# Patient Record
Sex: FEMALE | Race: WHITE | Employment: PART TIME | URBAN - METROPOLITAN AREA
[De-identification: names, ages, dates, MRNs, and addresses within clinical notes are randomized per-mention and may not be internally consistent; named-entity substitution may affect disease eponyms.]

---

## 2021-07-19 ENCOUNTER — HOSPITAL ENCOUNTER (OUTPATIENT)
Dept: NON INVASIVE DIAGNOSTICS | Age: 56
Discharge: HOME | End: 2021-07-19
Payer: COMMERCIAL

## 2021-07-19 PROCEDURE — 93005 ELECTROCARDIOGRAM TRACING: CPT | Performed by: FAMILY MEDICINE

## 2021-07-20 LAB
EKG ATRIAL RATE: 64 BPM
EKG P AXIS: 62 DEGREES
EKG P-R INTERVAL: 126 MS
EKG Q-T INTERVAL: 394 MS
EKG QRS DURATION: 80 MS
EKG QTC CALCULATION (BAZETT): 406 MS
EKG R AXIS: 75 DEGREES
EKG T AXIS: 28 DEGREES
EKG VENTRICULAR RATE: 64 BPM

## 2021-07-20 PROCEDURE — 93010 ELECTROCARDIOGRAM REPORT: CPT | Performed by: INTERNAL MEDICINE

## 2025-01-13 ENCOUNTER — APPOINTMENT (OUTPATIENT)
Dept: PRIMARY CARE | Facility: CLINIC | Age: 60
End: 2025-01-13
Payer: COMMERCIAL

## 2025-01-13 VITALS
BODY MASS INDEX: 32.1 KG/M2 | SYSTOLIC BLOOD PRESSURE: 104 MMHG | OXYGEN SATURATION: 98 % | DIASTOLIC BLOOD PRESSURE: 80 MMHG | WEIGHT: 170 LBS | HEART RATE: 88 BPM | RESPIRATION RATE: 14 BRPM | HEIGHT: 61 IN

## 2025-01-13 DIAGNOSIS — Z12.31 ENCOUNTER FOR SCREENING MAMMOGRAM FOR BREAST CANCER: ICD-10-CM

## 2025-01-13 DIAGNOSIS — Z00.00 PREVENTATIVE HEALTH CARE: ICD-10-CM

## 2025-01-13 DIAGNOSIS — E55.9 MILD VITAMIN D DEFICIENCY: ICD-10-CM

## 2025-01-13 DIAGNOSIS — R63.5 WEIGHT GAIN: Primary | ICD-10-CM

## 2025-01-13 PROCEDURE — 99203 OFFICE O/P NEW LOW 30 MIN: CPT | Performed by: INTERNAL MEDICINE

## 2025-01-13 PROCEDURE — 3008F BODY MASS INDEX DOCD: CPT | Performed by: INTERNAL MEDICINE

## 2025-01-13 ASSESSMENT — ENCOUNTER SYMPTOMS
DIARRHEA: 0
NAUSEA: 0
FEVER: 0
CHILLS: 0
JOINT SWELLING: 0
DIAPHORESIS: 0
MYALGIAS: 0
COUGH: 0
SHORTNESS OF BREATH: 0
CONSTIPATION: 0
VOMITING: 0

## 2025-01-13 ASSESSMENT — PATIENT HEALTH QUESTIONNAIRE - PHQ9
SUM OF ALL RESPONSES TO PHQ9 QUESTIONS 1 AND 2: 0
2. FEELING DOWN, DEPRESSED OR HOPELESS: NOT AT ALL
1. LITTLE INTEREST OR PLEASURE IN DOING THINGS: NOT AT ALL

## 2025-01-13 NOTE — PATIENT INSTRUCTIONS
FASTING LABS ARE ORDERED FOR YOU    2.  SIGN TO GET LABS, XRAYS, MAMMOGRAM, IMMUNIZATION RECORDS, COLOGUARD TEST RESULT FROM [facility]IOR CLIN[facility] IN [place].    3.  ONCE WE GET SOME LAB TESTS BACK, THEN I'LL LET YOU KNOW AND LIKELY REFER YOU TO THE PHARMACY CONSULTANT TO WORK ON ACQUIRING FOR YOU THE BEST WEIGHT LOSS MEDICATION FOR YOU THAT WILL BE MOST COST CONSERVING.  IF THEY DECIDE UPON ADIPEX OR SIMILAR AS THE BEST OPTION, THEN I'LL WORK WITH YOU ON THAT HERE    4.  CONSIDER GYN EXAMINATION AS PART OF ROUTINE CARE FOR YOUR AGE GROUP, CALL ME IF YOU'D LIKE REFERRAL    5. SCREENING MAMMOGRAM ORDERED FOR YOU    6.  FOLLOW UP AFTER TESTING.

## 2025-01-13 NOTE — PROGRESS NOTES
"Subjective   Preeti Burch is a 60 y.o. female who presents for NP ESTABLISH   DISCUSS WT   .    HPI   TAKE NO MEDICINE    HEALTH HAS BEEN GOOD/STABLE.    [employer].    NEVER HAD GLUCOSE OR DIABETIC TROUBLES.    TRIED ADIPEX 2-3 YEARS AGO, LAST TIME BLOOD DRAWN.    ADIPEX WORKED FOR HER, BUT CAN ONLY TAKE IT FOR A FEW MONTHS.    DID COLOGUARD DONE 1.5 YEARS AGO?    GAIN WEIGHT BUT CAN'T LOSE IT    Review of Systems   Constitutional:  Negative for chills, diaphoresis and fever.   Respiratory:  Negative for cough and shortness of breath.    Cardiovascular:  Negative for chest pain and leg swelling.   Gastrointestinal:  Negative for constipation, diarrhea, nausea and vomiting.   Musculoskeletal:  Negative for joint swelling and myalgias.       Objective   /80   Pulse 88   Resp 14   Ht 1.549 m (5' 1\")   Wt 77.1 kg (170 lb)   SpO2 98%   BMI 32.12 kg/m²     Physical Exam  Vitals reviewed.   Constitutional:       General: She is not in acute distress.     Appearance: She is not ill-appearing.      Comments: OVERWEIGHT   Cardiovascular:      Rate and Rhythm: Normal rate and regular rhythm.      Pulses: Normal pulses.      Heart sounds:      No gallop.   Pulmonary:      Breath sounds: Normal breath sounds. No wheezing, rhonchi or rales.   Abdominal:      General: Abdomen is flat. Bowel sounds are normal.      Palpations: Abdomen is soft.      Tenderness: There is no guarding or rebound.   Musculoskeletal:      Right lower leg: No edema.      Left lower leg: No edema.         Assessment/Plan   Problem List Items Addressed This Visit    None  Visit Diagnoses       Weight gain    -  Primary    Relevant Orders    Vitamin B12    Lipid Panel    TSH with reflex to Free T4 if abnormal    Comprehensive Metabolic Panel    CBC    Encounter for screening mammogram for breast cancer        Relevant Orders    BI mammo bilateral screening tomosynthesis    Preventative health care        Relevant Orders    Vitamin B12 "    Lipid Panel    TSH with reflex to Free T4 if abnormal    Comprehensive Metabolic Panel    CBC    Mild vitamin D deficiency        Relevant Orders    Vitamin D 25-Hydroxy,Total (for eval of Vitamin D levels)          Patient Instructions    FASTING LABS ARE ORDERED FOR YOU    2.  SIGN TO GET LABS, XRAYS, MAMMOGRAM, IMMUNIZATION RECORDS, COLOGUARD TEST RESULT FROM [facility]IOR CLIN[facility] IN [place].    3.  ONCE WE GET SOME LAB TESTS BACK, THEN I'LL LET YOU KNOW AND LIKELY REFER YOU TO THE PHARMACY CONSULTANT TO WORK ON ACQUIRING FOR YOU THE BEST WEIGHT LOSS MEDICATION FOR YOU THAT WILL BE MOST COST CONSERVING.  IF THEY DECIDE UPON ADIPEX OR SIMILAR AS THE BEST OPTION, THEN I'LL WORK WITH YOU ON THAT HERE    4.  CONSIDER GYN EXAMINATION AS PART OF ROUTINE CARE FOR YOUR AGE GROUP, CALL ME IF YOU'D LIKE REFERRAL    5. SCREENING MAMMOGRAM ORDERED FOR YOU    6.  FOLLOW UP AFTER TESTING.

## 2025-02-05 LAB
ALBUMIN SERPL-MCNC: 4.8 G/DL (ref 3.6–5.1)
ALP SERPL-CCNC: 83 U/L (ref 37–153)
ALT SERPL-CCNC: 36 U/L (ref 6–29)
ANION GAP SERPL CALCULATED.4IONS-SCNC: 12 MMOL/L (CALC) (ref 7–17)
AST SERPL-CCNC: 31 U/L (ref 10–35)
BILIRUB SERPL-MCNC: 0.7 MG/DL (ref 0.2–1.2)
BUN SERPL-MCNC: 11 MG/DL (ref 7–25)
CALCIUM SERPL-MCNC: 9.8 MG/DL (ref 8.6–10.4)
CHLORIDE SERPL-SCNC: 104 MMOL/L (ref 98–110)
CHOLEST SERPL-MCNC: 182 MG/DL
CHOLEST/HDLC SERPL: 3.4 (CALC)
CO2 SERPL-SCNC: 25 MMOL/L (ref 20–32)
CREAT SERPL-MCNC: 0.74 MG/DL (ref 0.5–1.05)
EGFRCR SERPLBLD CKD-EPI 2021: 91 ML/MIN/1.73M2
GLUCOSE SERPL-MCNC: 82 MG/DL (ref 65–99)
HDLC SERPL-MCNC: 53 MG/DL
LDLC SERPL CALC-MCNC: 110 MG/DL (CALC)
NONHDLC SERPL-MCNC: 129 MG/DL (CALC)
POTASSIUM SERPL-SCNC: 4.3 MMOL/L (ref 3.5–5.3)
PROT SERPL-MCNC: 8.5 G/DL (ref 6.1–8.1)
SODIUM SERPL-SCNC: 141 MMOL/L (ref 135–146)
TRIGL SERPL-MCNC: 94 MG/DL

## 2025-02-11 DIAGNOSIS — E66.09 CLASS 1 OBESITY DUE TO EXCESS CALORIES WITHOUT SERIOUS COMORBIDITY WITH BODY MASS INDEX (BMI) OF 32.0 TO 32.9 IN ADULT: ICD-10-CM

## 2025-02-11 DIAGNOSIS — E88.09 HYPERPROTEINEMIA: Primary | ICD-10-CM

## 2025-02-11 DIAGNOSIS — E66.811 CLASS 1 OBESITY DUE TO EXCESS CALORIES WITHOUT SERIOUS COMORBIDITY WITH BODY MASS INDEX (BMI) OF 32.0 TO 32.9 IN ADULT: ICD-10-CM

## 2025-02-11 LAB
25(OH)D3+25(OH)D2 SERPL-MCNC: 42 NG/ML (ref 30–100)
ERYTHROCYTE [DISTWIDTH] IN BLOOD BY AUTOMATED COUNT: 13.4 % (ref 11–15)
HCT VFR BLD AUTO: 46.6 % (ref 35–45)
HGB BLD-MCNC: 14.7 G/DL (ref 11.7–15.5)
MCH RBC QN AUTO: 29.6 PG (ref 27–33)
MCHC RBC AUTO-ENTMCNC: 31.5 G/DL (ref 32–36)
MCV RBC AUTO: 94 FL (ref 80–100)
PLATELET # BLD AUTO: 246 THOUSAND/UL (ref 140–400)
PMV BLD REES-ECKER: 12.7 FL (ref 7.5–12.5)
RBC # BLD AUTO: 4.96 MILLION/UL (ref 3.8–5.1)
TSH SERPL-ACNC: 1.47 MIU/L (ref 0.4–4.5)
VIT B12 SERPL-MCNC: 289 PG/ML (ref 200–1100)
WBC # BLD AUTO: 7.8 THOUSAND/UL (ref 3.8–10.8)

## 2025-02-12 ENCOUNTER — APPOINTMENT (OUTPATIENT)
Dept: RADIOLOGY | Facility: HOSPITAL | Age: 60
End: 2025-02-12
Payer: COMMERCIAL

## 2025-03-19 ENCOUNTER — APPOINTMENT (OUTPATIENT)
Dept: PRIMARY CARE | Facility: CLINIC | Age: 60
End: 2025-03-19
Payer: COMMERCIAL

## 2025-03-19 VITALS
OXYGEN SATURATION: 97 % | DIASTOLIC BLOOD PRESSURE: 80 MMHG | WEIGHT: 171 LBS | SYSTOLIC BLOOD PRESSURE: 122 MMHG | HEIGHT: 61 IN | RESPIRATION RATE: 14 BRPM | BODY MASS INDEX: 32.28 KG/M2

## 2025-03-19 DIAGNOSIS — E66.09 CLASS 1 OBESITY DUE TO EXCESS CALORIES WITHOUT SERIOUS COMORBIDITY WITH BODY MASS INDEX (BMI) OF 32.0 TO 32.9 IN ADULT: ICD-10-CM

## 2025-03-19 DIAGNOSIS — Z12.31 ENCOUNTER FOR SCREENING MAMMOGRAM FOR BREAST CANCER: Primary | ICD-10-CM

## 2025-03-19 DIAGNOSIS — Z00.00 PREVENTATIVE HEALTH CARE: ICD-10-CM

## 2025-03-19 DIAGNOSIS — E66.811 CLASS 1 OBESITY DUE TO EXCESS CALORIES WITHOUT SERIOUS COMORBIDITY WITH BODY MASS INDEX (BMI) OF 32.0 TO 32.9 IN ADULT: ICD-10-CM

## 2025-03-19 DIAGNOSIS — Z12.11 ENCOUNTER FOR SCREENING FOR MALIGNANT NEOPLASM OF COLON: ICD-10-CM

## 2025-03-19 PROCEDURE — 90471 IMMUNIZATION ADMIN: CPT | Performed by: INTERNAL MEDICINE

## 2025-03-19 PROCEDURE — 3008F BODY MASS INDEX DOCD: CPT | Performed by: INTERNAL MEDICINE

## 2025-03-19 PROCEDURE — 99213 OFFICE O/P EST LOW 20 MIN: CPT | Performed by: INTERNAL MEDICINE

## 2025-03-19 PROCEDURE — 90677 PCV20 VACCINE IM: CPT | Performed by: INTERNAL MEDICINE

## 2025-03-19 ASSESSMENT — PATIENT HEALTH QUESTIONNAIRE - PHQ9
1. LITTLE INTEREST OR PLEASURE IN DOING THINGS: NOT AT ALL
2. FEELING DOWN, DEPRESSED OR HOPELESS: NOT AT ALL
SUM OF ALL RESPONSES TO PHQ9 QUESTIONS 1 AND 2: 0

## 2025-03-19 NOTE — PATIENT INSTRUCTIONS
PREVNAR-20 PNEUMONIA IMMUNIZATION IS ADMINISTERED TO YOU TODAY, IT LASTS 7-10 YEARS    2.  MAMMOGRAM IS ORDERED.    3.  YOUR LABS REVIEWED LOOK GREAT, NEED ONE MORE BLOOD TEST TO VERIFY THE CORRECT PROTEIN IN THE BLOOD STREAM    4.  PHARMACY REFERRAL REGARDING THE BEST WEIGHT LOSS MEDICATION FOR YOU    5.  FOLLOW UP 6 MONTHS OR AS NEEDED FOR WEIGHT LOSS MEDS.

## 2025-03-19 NOTE — PROGRESS NOTES
"Subjective   Preeti Burch is a 60 y.o. female who presents for FOLLOW UP   .    HPI   GOT TETANUS SHOT IN THE PAST FEW YEARS    INTERESTED IN LOSING WEIGHT    NO OTHER COMPLAINTS    Review of Systems   Constitutional:  Negative for chills, diaphoresis and fever.   Respiratory:  Negative for cough and shortness of breath.    Cardiovascular:  Negative for chest pain and leg swelling.   Gastrointestinal:  Negative for constipation, diarrhea, nausea and vomiting.   Musculoskeletal:  Negative for joint swelling and myalgias.       Objective   /80   Resp 14   Ht 1.549 m (5' 1\")   Wt 77.6 kg (171 lb)   SpO2 97%   BMI 32.31 kg/m²     Physical Exam  Vitals reviewed.   Constitutional:       General: She is not in acute distress.     Appearance: She is not ill-appearing.      Comments: OVERWEIGHT   Cardiovascular:      Rate and Rhythm: Normal rate and regular rhythm.      Pulses: Normal pulses.      Heart sounds:      No gallop.   Pulmonary:      Breath sounds: Normal breath sounds. No wheezing, rhonchi or rales.   Abdominal:      General: Abdomen is flat. Bowel sounds are normal.      Palpations: Abdomen is soft.      Tenderness: There is no guarding or rebound.   Musculoskeletal:      Right lower leg: No edema.      Left lower leg: No edema.         Assessment/Plan   Problem List Items Addressed This Visit    None  Visit Diagnoses       Encounter for screening mammogram for breast cancer    -  Primary    Relevant Orders    BI mammo bilateral screening tomosynthesis    Encounter for screening for malignant neoplasm of colon        Relevant Orders    Cologuard® colon cancer screening    Class 1 obesity due to excess calories without serious comorbidity with body mass index (BMI) of 32.0 to 32.9 in adult        Relevant Orders    Referral to Clinical Pharmacy    Preventative health care        Relevant Orders    Pneumococcal conjugate vaccine, 20-valent (PREVNAR 20) (Completed)          Patient Instructions "    PREVNAR-20 PNEUMONIA IMMUNIZATION IS ADMINISTERED TO YOU TODAY, IT LASTS 7-10 YEARS    2.  MAMMOGRAM IS ORDERED.    3.  YOUR LABS REVIEWED LOOK GREAT, NEED ONE MORE BLOOD TEST TO VERIFY THE CORRECT PROTEIN IN THE BLOOD STREAM    4.  PHARMACY REFERRAL REGARDING THE BEST WEIGHT LOSS MEDICATION FOR YOU    5.  FOLLOW UP 6 MONTHS OR AS NEEDED FOR WEIGHT LOSS MEDS.

## 2025-03-24 ASSESSMENT — ENCOUNTER SYMPTOMS
FEVER: 0
CHILLS: 0
DIAPHORESIS: 0
DIARRHEA: 0
VOMITING: 0
COUGH: 0
NAUSEA: 0
MYALGIAS: 0
CONSTIPATION: 0
JOINT SWELLING: 0
SHORTNESS OF BREATH: 0

## 2025-03-26 NOTE — PROGRESS NOTES
Clinical Pharmacy Appointment    Patient ID: Preeti Burch is a 60 y.o. female who presents for Obesity.    Pt is here for First appointment.   Referring Provider: Victoriano Mayberry, *  PCP: Victoriano Mayberry MD, last visit: 3/19/25, next visit: 9/9/25    Subjective   HPI  PMH significant for obesity.  Special needs/barriers to therapy: None identified    Medication System Management  [facility] preferred [facility]:  [facility]  Adherence/Organization: No current concerns  Affordability/Accessibility: No current concerns    Drug Interactions  No relevant drug interactions were noted.    WEIGHT LOSS:   BMI Readings from Last 1 Encounters:   03/19/25 32.31 kg/m²   Starting weight: 171 lbs. (3/19/25)    Lifestyle  Diet: chicken salad sandwich, tuna, jello, rice pudding  Has worked with nutritionist/dietician? Yes, few years ago (stopped due to insurance change) , declined referral at this time  Physical Activity: treadmill few days per week, on feet all day at work ()    Other Potential Contributing Factors  Alcohol use: Average 0 drinks/week  Medications that may cause weight gain: none  Mental health: No current concerns  Eating Disorders: Denies Hx of any eating disorder  Comorbidities: none    Non-Pharmacological Therapy  Weight loss techniques attempted:  Self-directed dieting: low carb    Pharmacological Therapy  Current Medications: none  Previous Medications: Adipex (ineffective)     Clarifications to above regimen: none  Adverse Effects: none    Insurance coverage of weight-loss medications? No, Plan Benefit Exclusion  Eligible for copay cards/programs? No, Medicaid    Medication Estimated Cost Expected weight loss Patient Risks/Cautions Notes   Wegovy (semaglutide) ~$650/month w/ savings card 10-20% None      Zepbound (tirzepatide) $650/month   w/ savings card 10-20%     Qsymia (phentermine-topiramate) $98/month via [facility] 5-10% None Controlled substance   Contrave  "(bupropion-naltrexone) $99/month   via [employer] 5-10% None    Adipex (phentermine) ~$15/month 3-5% None Controlled substance,  Short-term use only   Jasson (OTC Orlistat) ~$60/month 3-5%  Adverse effects likely outweigh benefit.     Preventative Care  Immunizations Needed: Tdap  Tobacco Use: non-smoker    Objective   No Known Allergies  Social History     Social History Narrative    Not on file      Medication Review  No current outpatient medications   Vitals  BP Readings from Last 2 Encounters:   03/19/25 122/80   01/13/25 104/80     Labs  A1C  No results found for: \"HGBA1C\"  BMP  Lab Results   Component Value Date    CALCIUM 9.8 02/04/2025     02/04/2025    K 4.3 02/04/2025    CO2 25 02/04/2025     02/04/2025    BUN 11 02/04/2025    CREATININE 0.74 02/04/2025    EGFR 91 02/04/2025     LFTs  Lab Results   Component Value Date    ALT 36 (H) 02/04/2025    AST 31 02/04/2025    ALKPHOS 83 02/04/2025    BILITOT 0.7 02/04/2025     FLP  Lab Results   Component Value Date    TRIG 94 02/04/2025    CHOL 182 02/04/2025    LDLCALC 110 (H) 02/04/2025    HDL 53 02/04/2025     Urine Microalbumin  No results found for: \"MICROALBCREA\"  Weight Management  Wt Readings from Last 3 Encounters:   03/19/25 77.6 kg (171 lb)   01/13/25 77.1 kg (170 lb)      There is no height or weight on file to calculate BMI.     Assessment/Plan     WEIGHT LOSS:   Patient's current weight reported as 171 lbs.   Current regimen includes: no weight loss medications  Rationale for plan: Patient's insurance does not cover weight loss medications; Trulicity is covered. Previously tried Adipex, reported it was ineffective. Discussed available options, patient would like to try Trulicity at this time. Plan to start Trulicity 0.75mg once weekly for weight loss. Follow up in 4 weeks.     Medication Changes:  Start: Trulicity 0.75mg once weekly     Patient Education:  Counseled patient on relevant medication mechanisms of action, expectations, side " effects, duration of therapy, contraindications, administration, and monitoring parameters.  All questions and concerns addressed. Contact pharmacist with any further questions or concerns prior to next appointment.    Clinical Pharmacist follow-up: 4/25/25 11:00AM, Telehealth visit    Thank You,  Heather Ghosh, PharmD  PGY-1 Pharmacy Resident    Continue all meds under the continuation of care with the referring provider and clinical pharmacy team.  Verbal consent to manage patient's drug therapy was obtained from the patient. They were informed they may decline to participate or withdraw from participation in pharmacy services at any time.

## 2025-03-28 ENCOUNTER — PHARMACY VISIT (OUTPATIENT)
Dept: PHARMACY | Facility: CLINIC | Age: 60
End: 2025-03-28
Payer: MEDICAID

## 2025-03-28 ENCOUNTER — TELEMEDICINE (OUTPATIENT)
Dept: PHARMACY | Facility: HOSPITAL | Age: 60
End: 2025-03-28
Payer: COMMERCIAL

## 2025-03-28 DIAGNOSIS — E66.09 CLASS 1 OBESITY DUE TO EXCESS CALORIES WITHOUT SERIOUS COMORBIDITY WITH BODY MASS INDEX (BMI) OF 32.0 TO 32.9 IN ADULT: ICD-10-CM

## 2025-03-28 DIAGNOSIS — E66.811 CLASS 1 OBESITY DUE TO EXCESS CALORIES WITHOUT SERIOUS COMORBIDITY WITH BODY MASS INDEX (BMI) OF 32.0 TO 32.9 IN ADULT: ICD-10-CM

## 2025-03-28 PROCEDURE — RXMED WILLOW AMBULATORY MEDICATION CHARGE

## 2025-03-28 RX ORDER — DULAGLUTIDE 0.75 MG/.5ML
0.75 INJECTION, SOLUTION SUBCUTANEOUS WEEKLY
Qty: 2 ML | Refills: 0 | Status: SHIPPED | OUTPATIENT
Start: 2025-03-28

## 2025-04-21 ENCOUNTER — HOSPITAL ENCOUNTER (OUTPATIENT)
Dept: RADIOLOGY | Facility: HOSPITAL | Age: 60
Discharge: HOME | End: 2025-04-21
Payer: COMMERCIAL

## 2025-04-21 VITALS — WEIGHT: 171 LBS | HEIGHT: 61 IN | BODY MASS INDEX: 32.28 KG/M2

## 2025-04-21 DIAGNOSIS — Z12.31 ENCOUNTER FOR SCREENING MAMMOGRAM FOR BREAST CANCER: ICD-10-CM

## 2025-04-21 PROCEDURE — 77063 BREAST TOMOSYNTHESIS BI: CPT

## 2025-04-21 PROCEDURE — 77063 BREAST TOMOSYNTHESIS BI: CPT | Performed by: RADIOLOGY

## 2025-04-21 PROCEDURE — 77067 SCR MAMMO BI INCL CAD: CPT | Performed by: RADIOLOGY

## 2025-04-25 ENCOUNTER — PHARMACY VISIT (OUTPATIENT)
Dept: PHARMACY | Facility: CLINIC | Age: 60
End: 2025-04-25
Payer: MEDICAID

## 2025-04-25 ENCOUNTER — APPOINTMENT (OUTPATIENT)
Dept: PHARMACY | Facility: HOSPITAL | Age: 60
End: 2025-04-25
Payer: COMMERCIAL

## 2025-04-25 DIAGNOSIS — E66.09 CLASS 1 OBESITY DUE TO EXCESS CALORIES WITHOUT SERIOUS COMORBIDITY WITH BODY MASS INDEX (BMI) OF 32.0 TO 32.9 IN ADULT: ICD-10-CM

## 2025-04-25 DIAGNOSIS — E66.811 CLASS 1 OBESITY DUE TO EXCESS CALORIES WITHOUT SERIOUS COMORBIDITY WITH BODY MASS INDEX (BMI) OF 32.0 TO 32.9 IN ADULT: ICD-10-CM

## 2025-04-25 PROCEDURE — RXMED WILLOW AMBULATORY MEDICATION CHARGE

## 2025-04-25 RX ORDER — DULAGLUTIDE 1.5 MG/.5ML
1.5 INJECTION, SOLUTION SUBCUTANEOUS WEEKLY
Qty: 2 ML | Refills: 0 | Status: SHIPPED | OUTPATIENT
Start: 2025-04-25

## 2025-04-29 NOTE — PROGRESS NOTES
Clinical Pharmacy Appointment    Patient ID: Preeti Burch is a 60 y.o. female who presents for Obesity.    Pt is here for Follow Up appointment.   Referring Provider: Victoriano Mayberry, *  PCP: Victoriano Mayberry MD, last visit: 3/19/25, next visit: 9/9/25    Subjective   HPI  PMH significant for Obesity.  Special needs/barriers to therapy: None identified    Medication System Management  Patients preferred pharmacy:  [facility]  Adherence/Organization: No current concerns  Affordability/Accessibility: No current concerns    Drug Interactions  No relevant drug interactions were noted.    WEIGHT MANAGEMENT  BMI Readings from Last 1 Encounters:   04/21/25 32.31 kg/m²   Starting weight: 171 lbs. (3/19/25)  Current weight: 171 lbs. (4/21/25)    Weight Goals  Next goal: Weight -5% (162 lbs.)  Maintenance BMI Goal: 23 to 29.9 reasonable due to age  Maintenance Weight Goal: 122 to 158 lbs.  Patient defined goal(s): Not defined    Lifestyle  Diet: Trying to manage portion sizes  Physical Activity: treadmill few days per week, on feet all day at work ()     Pertinent PMH Review  Comorbidities: none  Hx or FH Hx of MTC/MEN2?: No  Pancreatitis?: No  Gastroparesis?: No  Cholecystitis?: No    Pharmacological Therapy  Current Medications:   Trulicity 0.75mg once weekly (Fridays, 4 doses completed)  Previous Medications: Adipex (ineffective)     Clarifications to above regimen: None  Adverse Effects: None    Insurance coverage of weight-loss medications? No, plan benefit exclusion  Trulicity prescribed off-label for weight loss, covered without PA  Eligible for copay cards/programs? No, Medicaid beneficiary  Eligible for  PAP? N/A    Preventative Care  Immunizations Needed: Tdap  Tobacco Use: non-smoker    Objective   Allergies[1]  Social History     Social History Narrative    Not on file      Medication Review  Current Outpatient Medications   Medication Instructions    Trulicity 1.5 mg, subcutaneous,  "Weekly      Vitals  BP Readings from Last 2 Encounters:   03/19/25 122/80   01/13/25 104/80     BMI Readings from Last 1 Encounters:   04/21/25 32.31 kg/m²      Labs  A1C  No results found for: \"HGBA1C\"  BMP  Lab Results   Component Value Date    CALCIUM 9.8 02/04/2025     02/04/2025    K 4.3 02/04/2025    CO2 25 02/04/2025     02/04/2025    BUN 11 02/04/2025    CREATININE 0.74 02/04/2025    EGFR 91 02/04/2025     LFTs  Lab Results   Component Value Date    ALT 36 (H) 02/04/2025    AST 31 02/04/2025    ALKPHOS 83 02/04/2025    BILITOT 0.7 02/04/2025     FLP  Lab Results   Component Value Date    TRIG 94 02/04/2025    CHOL 182 02/04/2025    LDLCALC 110 (H) 02/04/2025    HDL 53 02/04/2025     Urine Microalbumin  No results found for: \"MICROALBCREA\"  Weight Management  Wt Readings from Last 3 Encounters:   04/21/25 77.6 kg (171 lb)   03/19/25 77.6 kg (171 lb)   01/13/25 77.1 kg (170 lb)      There is no height or weight on file to calculate BMI.    Assessment/Plan   Problem List Items Addressed This Visit    None  Visit Diagnoses         Class 1 obesity due to excess calories without serious comorbidity with body mass index (BMI) of 32.0 to 32.9 in adult        Relevant Medications    dulaglutide (Trulicity) 1.5 mg/0.5 mL pen injector injection    Other Relevant Orders    Referral to Clinical Pharmacy        Patient's current weight reported as 171 lbs. (BMI 32.3 kg/m2).  Current regimen includes: Trulicity 0.75mg once weekly  Rationale for plan: Patient tolerating initial month of Trulicity well, no adverse effects. She is working on managing portion sizes and hunger. Due to indication for further weight loss and patient preference, plan to continue titrating Trulicity as tolerated.    Medication Changes:  INCREASE  Trulicity 1.5mg once weekly     Patient Education:  Counseled patient on relevant medication mechanisms of action, expectations, side effects, duration of therapy, contraindications, " administration, and monitoring parameters.  All questions and concerns addressed. Contact pharmacist with any further questions or concerns prior to next appointment.    Clinical Pharmacist follow-up: 5/16/25 at 11:20am, Telehealth visit  Patient is not followed in [facility].    Thank you,  Angely Lopez, [facility]  Clinical Pharmacy Specialist  [phone]    Continue all meds under the continuation of care with the referring provider and clinical pharmacy team.  Verbal consent to manage patient's drug therapy was obtained from the patient. They were informed they may decline to participate or withdraw from participation in pharmacy services at any time.       [1] No Known Allergies

## 2025-05-15 NOTE — PROGRESS NOTES
"  Clinical Pharmacy Appointment    Patient ID: Preeti Burch is a 60 y.o. female who presents for Obesity.    Pt is here for Follow Up appointment.   Referring Provider: Victoriano Mayberry, *  PCP: Victoriano Mayberry MD, last visit: 3/19/25, next visit: 9/9/25    Subjective   HPI  PMH significant for obesity.  Special needs/barriers to therapy: None identified    Medication System Management  [facility] preferred [facility]:  [facility]  Adherence/Organization: No current concerns  Affordability/Accessibility: No current concerns    Drug Interactions  No relevant drug interactions were noted.    WEIGHT LOSS:   BMI Readings from Last 1 Encounters:   04/21/25 32.31 kg/m²   Starting weight: 171 lbs. (3/19/25)  Previous weight: 171 lbs. (4/21/25)  Current weight: 164 lbs.    Lifestyle  Physical Activity: walking dog every other day 15-20 minutes, gardening    Pharmacological Therapy  Current Medications: Trulicity 1.5mg once weekly (Mondays)  Previous Medications: Adipex (ineffective)      Clarifications to above regimen: using Trulicity off-label for weight loss  Adverse Effects: mild nausea    Preventative Care  Immunizations Needed: Tdap  Tobacco Use: non-smoker    Objective   Allergies[1]  Social History     Social History Narrative    Not on file      Medication Review  Current Outpatient Medications   Medication Instructions    Trulicity 1.5 mg, subcutaneous, Weekly      Vitals  BP Readings from Last 2 Encounters:   03/19/25 122/80   01/13/25 104/80     Labs  A1C  No results found for: \"HGBA1C\"  BMP  Lab Results   Component Value Date    CALCIUM 9.8 02/04/2025     02/04/2025    K 4.3 02/04/2025    CO2 25 02/04/2025     02/04/2025    BUN 11 02/04/2025    CREATININE 0.74 02/04/2025    EGFR 91 02/04/2025     LFTs  Lab Results   Component Value Date    ALT 36 (H) 02/04/2025    AST 31 02/04/2025    ALKPHOS 83 02/04/2025    BILITOT 0.7 02/04/2025     FLP  Lab Results   Component Value Date    " "TRIG 94 02/04/2025    CHOL 182 02/04/2025    LDLCALC 110 (H) 02/04/2025    HDL 53 02/04/2025     Urine Microalbumin  No results found for: \"MICROALBCREA\"  Weight Management  Wt Readings from Last 3 Encounters:   04/21/25 77.6 kg (171 lb)   03/19/25 77.6 kg (171 lb)   01/13/25 77.1 kg (170 lb)      There is no height or weight on file to calculate BMI.     Assessment/Plan     WEIGHT LOSS:   Patient's current weight reported as 164 lbs. Has lost 7 lbs. (4% of TBW) since starting therapy plan.  Current regimen includes: Trulicity 1.5mg once weekly  Rationale for plan: Patient is tolerating Trulicity well. Reported mild nausea, no other adverse effects. She is losing weight at an appropriate rate. Plan to continue Trulicity 1.5mg dose at this time. Follow up in 3 weeks.     Medication Changes:  No Medication Changes     Patient Education:  Counseled patient on relevant medication mechanisms of action, expectations, side effects, duration of therapy, contraindications, administration, and monitoring parameters.  All questions and concerns addressed. Contact pharmacist with any further questions or concerns prior to next appointment.    Clinical Pharmacist follow-up: 6/6/25 2:00PM, Telehealth visit    Thank You,  Heather Ghosh, PharmD  PGY-1 Pharmacy Resident    Continue all meds under the continuation of care with the referring provider and clinical pharmacy team.  Verbal consent to manage patient's drug therapy was obtained from the patient. They were informed they may decline to participate or withdraw from participation in pharmacy services at any time.         [1] No Known Allergies    "

## 2025-05-16 ENCOUNTER — PHARMACY VISIT (OUTPATIENT)
Dept: PHARMACY | Facility: CLINIC | Age: 60
End: 2025-05-16
Payer: MEDICAID

## 2025-05-16 ENCOUNTER — APPOINTMENT (OUTPATIENT)
Dept: PHARMACY | Facility: HOSPITAL | Age: 60
End: 2025-05-16
Payer: COMMERCIAL

## 2025-05-16 DIAGNOSIS — E66.09 CLASS 1 OBESITY DUE TO EXCESS CALORIES WITHOUT SERIOUS COMORBIDITY WITH BODY MASS INDEX (BMI) OF 32.0 TO 32.9 IN ADULT: ICD-10-CM

## 2025-05-16 DIAGNOSIS — E66.811 CLASS 1 OBESITY DUE TO EXCESS CALORIES WITHOUT SERIOUS COMORBIDITY WITH BODY MASS INDEX (BMI) OF 32.0 TO 32.9 IN ADULT: ICD-10-CM

## 2025-05-16 PROCEDURE — RXMED WILLOW AMBULATORY MEDICATION CHARGE

## 2025-05-16 RX ORDER — DULAGLUTIDE 1.5 MG/.5ML
1.5 INJECTION, SOLUTION SUBCUTANEOUS WEEKLY
Qty: 2 ML | Refills: 0 | Status: SHIPPED | OUTPATIENT
Start: 2025-05-16

## 2025-05-28 LAB — NONINV COLON CA DNA+OCC BLD SCRN STL QL: POSITIVE

## 2025-06-05 NOTE — PROGRESS NOTES
"  Clinical Pharmacy Appointment    Patient ID: Preeti Burch is a 60 y.o. female who presents for Obesity.    Pt is here for Follow Up appointment.   Referring Provider: Victoriano Mayberry, *  PCP: Victoriano Mayberry MD, last visit: 3/19/25, next visit: 9/9/25    Subjective   HPI  PMH significant for obesity.  Special needs/barriers to therapy: None identified    Medication System Management  Patients preferred [facility]:  [facility]  Adherence/Organization: No current concerns  Affordability/Accessibility: No current concerns    Drug Interactions  No relevant drug interactions were noted.    WEIGHT LOSS:   BMI Readings from Last 1 Encounters:   04/21/25 32.31 kg/m²   Starting weight: 171 lbs. (3/19/25)  Previous weight: 164 lbs. (5/16/25)  Current weight: 158 lbs.    Pharmacological Therapy  Current Medications: Trulicity 1.5mg once weekly (Mondays)   Previous Medications: Adipex (ineffective)      Clarifications to above regimen: using Trulicity off-label for weight loss   Adverse Effects: none    Preventative Care  Immunizations Needed: Tdap  Tobacco Use: non-smoker    Objective   Allergies[1]  Social History     Social History Narrative    Not on file      Medication Review  Current Outpatient Medications   Medication Instructions    Trulicity 1.5 mg, subcutaneous, Weekly      Vitals  BP Readings from Last 2 Encounters:   03/19/25 122/80   01/13/25 104/80     Labs  A1C  No results found for: \"HGBA1C\"  BMP  Lab Results   Component Value Date    CALCIUM 9.8 02/04/2025     02/04/2025    K 4.3 02/04/2025    CO2 25 02/04/2025     02/04/2025    BUN 11 02/04/2025    CREATININE 0.74 02/04/2025    EGFR 91 02/04/2025     LFTs  Lab Results   Component Value Date    ALT 36 (H) 02/04/2025    AST 31 02/04/2025    ALKPHOS 83 02/04/2025    BILITOT 0.7 02/04/2025     FLP  Lab Results   Component Value Date    TRIG 94 02/04/2025    CHOL 182 02/04/2025    LDLCALC 110 (H) 02/04/2025    HDL 53 02/04/2025 " "    Urine Microalbumin  No results found for: \"MICROALBCREA\"  Weight Management  Wt Readings from Last 3 Encounters:   04/21/25 77.6 kg (171 lb)   03/19/25 77.6 kg (171 lb)   01/13/25 77.1 kg (170 lb)      There is no height or weight on file to calculate BMI.     Assessment/Plan     WEIGHT LOSS:   Patient's current weight reported as 158 lbs. Has lost 13 lbs. (7.6% of TBW) since starting therapy plan.  Current regimen includes: Trulicity 1.5mg once weekly  Rationale for plan: Patient is tolerating Trulicity well, no adverse effects reported. Continuing to lose weight at an appropriate rate. Due to indication for further weight loss and per patient preference, will increase Trulicity to 3mg once weekly. Follow up in 3 weeks.     Medication Changes:  Increase: Trulicity from 1.5mg once weekly to 3mg once weekly     Patient Education:  Counseled patient on relevant medication mechanisms of action, expectations, side effects, duration of therapy, contraindications, administration, and monitoring parameters.  All questions and concerns addressed. Contact pharmacist with any further questions or concerns prior to next appointment.    Clinical Pharmacist follow-up: 6/27/25 2:20PM, Telehealth visit    Thank You,  Heather Ghosh, PharmD  PGY-1 Pharmacy Resident    Continue all meds under the continuation of care with the referring provider and clinical pharmacy team.  Verbal consent to manage patient's drug therapy was obtained from the patient. They were informed they may decline to participate or withdraw from participation in pharmacy services at any time.         [1] No Known Allergies    "

## 2025-06-06 ENCOUNTER — APPOINTMENT (OUTPATIENT)
Dept: PHARMACY | Facility: HOSPITAL | Age: 60
End: 2025-06-06
Payer: COMMERCIAL

## 2025-06-06 DIAGNOSIS — E66.09 CLASS 1 OBESITY DUE TO EXCESS CALORIES WITHOUT SERIOUS COMORBIDITY WITH BODY MASS INDEX (BMI) OF 32.0 TO 32.9 IN ADULT: ICD-10-CM

## 2025-06-06 DIAGNOSIS — E66.811 CLASS 1 OBESITY DUE TO EXCESS CALORIES WITHOUT SERIOUS COMORBIDITY WITH BODY MASS INDEX (BMI) OF 32.0 TO 32.9 IN ADULT: ICD-10-CM

## 2025-06-06 PROCEDURE — RXMED WILLOW AMBULATORY MEDICATION CHARGE

## 2025-06-10 ENCOUNTER — PHARMACY VISIT (OUTPATIENT)
Dept: PHARMACY | Facility: CLINIC | Age: 60
End: 2025-06-10
Payer: MEDICAID

## 2025-06-26 NOTE — PROGRESS NOTES
"  Clinical Pharmacy Appointment    Patient ID: Preeti Burch is a 60 y.o. female who presents for Obesity.    Pt is here for Follow Up appointment.   Appointment completed by: Preeti  Referring Provider: Victoriano Mayberry, *  PCP: Victoriano Mayberry MD, last visit: 3/19/25, next visit: 9/9/25    Subjective   HPI  PMH significant for obesity.  Special needs/barriers to therapy: None identified    Medication System Management  Patients preferred pharmacy:  [facility]  Adherence/Organization: No current concerns  Affordability/Accessibility: No current concerns    Drug Interactions  No relevant drug interactions were noted.    WEIGHT LOSS:   BMI Readings from Last 1 Encounters:   04/21/25 32.31 kg/m²   Starting weight: 171 lbs. (3/19/25)  Previous weight: 158 lbs. (6/6/25)  Current weight: 154 lbs.    Patient Reported Goal Weight: 135 lbs    Pharmacological Therapy  Current Medications: Trulicity 3mg once weekly (Mondays)   Previous Medications: Adipex (ineffective)      Clarifications to above regimen: using Trulicity off-label for weight loss   Adverse Effects: none    Preventative Care  Immunizations Needed: Tdap  Tobacco Use: non-smoker    Objective   Allergies[1]  Social History     Social History Narrative    Not on file      Medication Review  Current Outpatient Medications   Medication Instructions    dulaglutide (Trulicity) 3 mg/0.5 mL injection Inject 1 pen (3 mg) under the skin every 7 days.      Vitals  BP Readings from Last 2 Encounters:   03/19/25 122/80   01/13/25 104/80     Labs  A1C  No results found for: \"HGBA1C\"  BMP  Lab Results   Component Value Date    CALCIUM 9.8 02/04/2025     02/04/2025    K 4.3 02/04/2025    CO2 25 02/04/2025     02/04/2025    BUN 11 02/04/2025    CREATININE 0.74 02/04/2025    EGFR 91 02/04/2025     LFTs  Lab Results   Component Value Date    ALT 36 (H) 02/04/2025    AST 31 02/04/2025    ALKPHOS 83 02/04/2025    BILITOT 0.7 02/04/2025     FLP  Lab " "Results   Component Value Date    TRIG 94 02/04/2025    CHOL 182 02/04/2025    LDLCALC 110 (H) 02/04/2025    HDL 53 02/04/2025     Urine Microalbumin  No results found for: \"MICROALBCREA\"  Weight Management  Wt Readings from Last 3 Encounters:   04/21/25 77.6 kg (171 lb)   03/19/25 77.6 kg (171 lb)   01/13/25 77.1 kg (170 lb)      There is no height or weight on file to calculate BMI.     Assessment/Plan     WEIGHT LOSS:   Patient's current weight reported as 154 lbs. Has lost 17 lbs. (10% of TBW) since starting therapy plan.  Current regimen includes: Trulicity 3mg once weekly  Rationale for plan: Patient is tolerating Trulicity well. No adverse effects reported. She is continuing to lose weight at an appropriate rate. Reported she is eating smaller meals throughout the day. Plan to continue Trulicity 3mg dose at this time. Follow up in 1 month.     Medication Changes:  No Medication Changes      Patient Education:  Counseled patient on relevant medication mechanisms of action, expectations, side effects, duration of therapy, contraindications, administration, and monitoring parameters.  All questions and concerns addressed. Contact pharmacist with any further questions or concerns prior to next appointment.    Clinical Pharmacist follow-up: 7/25/25 2:00PM, Telehealth visit    Thank You,  Heather Ghosh, PharmD  PGY-1 Pharmacy Resident    Continue all meds under the continuation of care with the referring provider and clinical pharmacy team.  Verbal consent to manage patient's drug therapy was obtained from the patient. They were informed they may decline to participate or withdraw from participation in pharmacy services at any time.         [1] No Known Allergies    "

## 2025-06-27 ENCOUNTER — APPOINTMENT (OUTPATIENT)
Dept: PHARMACY | Facility: HOSPITAL | Age: 60
End: 2025-06-27
Payer: COMMERCIAL

## 2025-06-27 DIAGNOSIS — E66.811 CLASS 1 OBESITY DUE TO EXCESS CALORIES WITHOUT SERIOUS COMORBIDITY WITH BODY MASS INDEX (BMI) OF 32.0 TO 32.9 IN ADULT: ICD-10-CM

## 2025-06-27 DIAGNOSIS — E66.09 CLASS 1 OBESITY DUE TO EXCESS CALORIES WITHOUT SERIOUS COMORBIDITY WITH BODY MASS INDEX (BMI) OF 32.0 TO 32.9 IN ADULT: ICD-10-CM

## 2025-06-27 PROCEDURE — RXMED WILLOW AMBULATORY MEDICATION CHARGE

## 2025-06-30 ENCOUNTER — PHARMACY VISIT (OUTPATIENT)
Dept: PHARMACY | Facility: CLINIC | Age: 60
End: 2025-06-30
Payer: MEDICAID

## 2025-07-25 ENCOUNTER — APPOINTMENT (OUTPATIENT)
Dept: PHARMACY | Facility: HOSPITAL | Age: 60
End: 2025-07-25
Payer: COMMERCIAL

## 2025-07-25 DIAGNOSIS — E66.811 CLASS 1 OBESITY DUE TO EXCESS CALORIES WITHOUT SERIOUS COMORBIDITY WITH BODY MASS INDEX (BMI) OF 32.0 TO 32.9 IN ADULT: Primary | ICD-10-CM

## 2025-07-25 DIAGNOSIS — E66.09 CLASS 1 OBESITY DUE TO EXCESS CALORIES WITHOUT SERIOUS COMORBIDITY WITH BODY MASS INDEX (BMI) OF 32.0 TO 32.9 IN ADULT: Primary | ICD-10-CM

## 2025-07-25 PROCEDURE — RXMED WILLOW AMBULATORY MEDICATION CHARGE

## 2025-07-25 RX ORDER — DULAGLUTIDE 4.5 MG/.5ML
4.5 INJECTION, SOLUTION SUBCUTANEOUS WEEKLY
Qty: 2 ML | Refills: 0 | Status: SHIPPED | OUTPATIENT
Start: 2025-07-25

## 2025-07-25 NOTE — PROGRESS NOTES
Clinical Pharmacy Appointment    Patient ID: Preeti Burch is a 60 y.o. female who presents for Obesity.    Pt is here for Follow Up appointment.  Referring Provider: Victoriano Mayberry, * - last visit: 3/19/25, next visit: 9/9/25  PCP: Victoriano Mayberry MD     Subjective   HPI  PMH significant for obesity.  Special needs/barriers to therapy: None identified    Medication Reconciliation:  No changes    Drug Interactions  No relevant drug interactions were noted.    Medication System Management  Adherence/Organization: No current concerns  Affordability/Accessibility: No current concerns  Patient's preferred pharmacy:     [facility]  [address]  [place] [address]  Phone: [phone] Fax: [phone]       WEIGHT MANAGEMENT  BMI Readings from Last 1 Encounters:   04/21/25 32.31 kg/m²   Starting weight: 171 lbs. (3/19/25)   Previous weight: 154 lbs. (6/6/25)  Current weight: 151 lbs.    Weight Goals  Next goal: Maintain within goal range  Maintenance BMI Goal: 23 to 29.9 reasonable due to age  Maintenance Weight Goal: 122 to 158 lbs.  Patient defined goal(s): 135 lbs.    Lifestyle  Diet: 2 meals/day and snacks. Not as hungry recently and household has been eating better in general.  Physical Activity: More yardwork recently    Pertinent PMH Review  Comorbidities: none  Hx or FH Hx of MTC/MEN2?: No  Pancreatitis?: No  Gastroparesis?: No  Cholecystitis?: No    Pharmacological Therapy  Current Medications:   Trulicity 3mg once weekly (Mondays)   Previous Medications: Adipex (ineffective)       Clarifications to above regimen: using Trulicity off-label for weight loss   Adverse Effects: Occasional nausea       Preventative Care  Immunizations Needed: Tdap  Tobacco Use: non-smoker      Objective   Allergies[1]  Social History     Social History Narrative    Not on file      Medication Review  Current Outpatient Medications   Medication Instructions    dulaglutide  "(Trulicity) 4.5 mg/0.5 mL pen injector Inject 1 pen (4.5 mg) under the skin 1 (one) time per week.      Vitals  BP Readings from Last 2 Encounters:   03/19/25 122/80   01/13/25 104/80     Wt Readings from Last 3 Encounters:   04/21/25 77.6 kg (171 lb)   03/19/25 77.6 kg (171 lb)   01/13/25 77.1 kg (170 lb)      There is no height or weight on file to calculate BMI.  Labs  A1C  No results found for: \"HGBA1C\"  Metabolic Panel  Lab Results   Component Value Date    EGFR 91 02/04/2025    CREATININE 0.74 02/04/2025     02/04/2025    K 4.3 02/04/2025    CALCIUM 9.8 02/04/2025     02/04/2025    CO2 25 02/04/2025    BUN 11 02/04/2025     Liver function  Lab Results   Component Value Date    ALT 36 (H) 02/04/2025    AST 31 02/04/2025    ALKPHOS 83 02/04/2025    BILITOT 0.7 02/04/2025     Lipid Panel  Lab Results   Component Value Date    CHOL 182 02/04/2025    LDLCALC 110 (H) 02/04/2025    TRIG 94 02/04/2025    HDL 53 02/04/2025     Urine Microalbumin  No results found for: \"MICROALBCREA\"    Assessment/Plan   Problem List Items Addressed This Visit       Class 1 obesity due to excess calories without serious comorbidity with body mass index (BMI) of 32.0 to 32.9 in adult - Primary    Patient's current weight reported as 151 lbs. Has lost 20 lbs. (11.7% of TBW) since starting therapy plan.  Current regimen includes: Trulicity 3mg weekly  Rationale for plan: Patient tolerating current dose of Trulicity with some occasional nausea. Continuing to lose weight at appropriate rate. Has noticed reduced appetite and making better food choices for household. Due to metabolic benefits and patient preference, plan to continue titrating Trulicity to 4.5mg. Follow-up in 1 month.     Medication Changes:  INCREASE  Trulicity 4.5mg once weekly          Relevant Medications    dulaglutide (Trulicity) 4.5 mg/0.5 mL pen injector    Other Relevant Orders    Referral to Clinical Pharmacy     Patient Education:  Counseled patient on " relevant medication mechanisms of action, expectations, side effects, duration of therapy, contraindications, administration, and monitoring parameters.  All questions and concerns addressed. Contact pharmacist with any further questions or concerns prior to next appointment.    Clinical Pharmacist follow-up: 8/22/25 at 2:00pm, Telehealth visit  Patient is not followed in [facility].    Thank you,  Angely Lopez [facility]  Clinical Pharmacy Specialist  [phone]    Continue all meds under the continuation of care with the referring provider and clinical pharmacy team.  Verbal consent to manage patient's drug therapy was obtained from the patient. They were informed they may decline to participate or withdraw from participation in pharmacy services at any time.         [1] No Known Allergies

## 2025-07-30 PROBLEM — E66.09 CLASS 1 OBESITY DUE TO EXCESS CALORIES WITHOUT SERIOUS COMORBIDITY WITH BODY MASS INDEX (BMI) OF 32.0 TO 32.9 IN ADULT: Status: ACTIVE | Noted: 2025-07-30

## 2025-07-30 PROBLEM — E66.811 CLASS 1 OBESITY DUE TO EXCESS CALORIES WITHOUT SERIOUS COMORBIDITY WITH BODY MASS INDEX (BMI) OF 32.0 TO 32.9 IN ADULT: Status: ACTIVE | Noted: 2025-07-30

## 2025-07-30 NOTE — ASSESSMENT & PLAN NOTE
Patient's current weight reported as 151 lbs. Has lost 20 lbs. (11.7% of TBW) since starting therapy plan.  Current regimen includes: Trulicity 3mg weekly  Rationale for plan: Patient tolerating current dose of Trulicity with some occasional nausea. Continuing to lose weight at appropriate rate. Has noticed reduced appetite and making better food choices for household. Due to metabolic benefits and patient preference, plan to continue titrating Trulicity to 4.5mg. Follow-up in 1 month.     Medication Changes:  INCREASE  Trulicity 4.5mg once weekly

## 2025-07-31 ENCOUNTER — PHARMACY VISIT (OUTPATIENT)
Dept: PHARMACY | Facility: CLINIC | Age: 60
End: 2025-07-31
Payer: MEDICAID

## 2025-08-22 ENCOUNTER — APPOINTMENT (OUTPATIENT)
Dept: PHARMACY | Facility: HOSPITAL | Age: 60
End: 2025-08-22
Payer: COMMERCIAL

## 2025-08-22 DIAGNOSIS — E66.09 CLASS 1 OBESITY DUE TO EXCESS CALORIES WITHOUT SERIOUS COMORBIDITY WITH BODY MASS INDEX (BMI) OF 32.0 TO 32.9 IN ADULT: ICD-10-CM

## 2025-08-22 DIAGNOSIS — E66.811 CLASS 1 OBESITY DUE TO EXCESS CALORIES WITHOUT SERIOUS COMORBIDITY WITH BODY MASS INDEX (BMI) OF 32.0 TO 32.9 IN ADULT: ICD-10-CM

## 2025-08-22 PROCEDURE — RXMED WILLOW AMBULATORY MEDICATION CHARGE

## 2025-08-22 RX ORDER — DULAGLUTIDE 4.5 MG/.5ML
4.5 INJECTION, SOLUTION SUBCUTANEOUS
Qty: 2 ML | Refills: 3 | Status: SHIPPED | OUTPATIENT
Start: 2025-08-22

## 2025-08-28 ENCOUNTER — PHARMACY VISIT (OUTPATIENT)
Dept: PHARMACY | Facility: CLINIC | Age: 60
End: 2025-08-28
Payer: MEDICAID

## 2025-09-09 ENCOUNTER — APPOINTMENT (OUTPATIENT)
Dept: PRIMARY CARE | Facility: CLINIC | Age: 60
End: 2025-09-09
Payer: COMMERCIAL

## 2025-09-19 ENCOUNTER — APPOINTMENT (OUTPATIENT)
Dept: PHARMACY | Facility: HOSPITAL | Age: 60
End: 2025-09-19
Payer: COMMERCIAL